# Patient Record
Sex: FEMALE | Race: WHITE
[De-identification: names, ages, dates, MRNs, and addresses within clinical notes are randomized per-mention and may not be internally consistent; named-entity substitution may affect disease eponyms.]

---

## 2021-05-14 ENCOUNTER — HOSPITAL ENCOUNTER (OUTPATIENT)
Dept: HOSPITAL 61 - US | Age: 81
End: 2021-05-14
Attending: EMERGENCY MEDICINE
Payer: MEDICARE

## 2021-05-14 DIAGNOSIS — I87.011: Primary | ICD-10-CM

## 2021-05-14 DIAGNOSIS — L97.212: ICD-10-CM

## 2021-05-14 PROCEDURE — 93926 LOWER EXTREMITY STUDY: CPT

## 2021-05-14 PROCEDURE — 93922 UPR/L XTREMITY ART 2 LEVELS: CPT

## 2021-05-14 NOTE — RAD
DUPLEX SONOGRAPHY OF THE PERIPHERAL ARTERIAL SYSTEM OF THE RIGHT LOWER EXTREMITY



Clinical indications: Nonhealing wound of the right leg.



Findings: Duplex sonography of the peripheral arterial system of the right lower extremity including 
gray scale and color flow and spectral waveform analysis was performed.Triphasic waveforms are seen. 
No occlusive disease is seen. No significant plaque formation or stenosis is identified.



The measurements were performed using the NASCET criteria.



Peak systolic flow velocities are as follows:



Right leg:  common femoral artery- 163 cm/sec, profunda femoral artery -59 cm/sec, proximal superfici
al femoral artery -82 cm/sec, mid superficial femoral artery -85 cm/sec, distal superficial femoral a
rtery- 131 cm/sec, popliteal artery -88 cm/sec, proximal posterior tibial artery- 69 cm/sec, distal p
osterior tibial artery- 38 cm/sec, peroneal artery-not visualized, anterior tibial artery- C7 cm/sec,
 dorsalis pedis artery -104 cm/sec.







IMPRESSION: No significant peripheral arterial vascular disease is seen by duplex sonographic evaluat
ion.





ANKLE BRACHIAL INDEX STUDY ON THE RIGHT SIDE:



Clinical indications: Same.



Right side: 



  Arm: The systolic blood pressure is 198 mmHg

  Ankle: Could not compress. Therefore, the ankle brachial index on the right side could not be obtai
jennifer.



IMPRESSION: Could not obtain DERICK on the right side.



Electronically signed by: Gama Che MD (5/14/2021 4:11 PM) [id number]

## 2021-05-14 NOTE — RAD
DUPLEX SONOGRAPHY OF THE PERIPHERAL ARTERIAL SYSTEM OF THE RIGHT LOWER EXTREMITY



Clinical indications: Nonhealing wound of the right leg.



Findings: Duplex sonography of the peripheral arterial system of the right lower extremity including 
gray scale and color flow and spectral waveform analysis was performed.Triphasic waveforms are seen. 
No occlusive disease is seen. No significant plaque formation or stenosis is identified.



The measurements were performed using the NASCET criteria.



Peak systolic flow velocities are as follows:



Right leg:  common femoral artery- 163 cm/sec, profunda femoral artery -59 cm/sec, proximal superfici
al femoral artery -82 cm/sec, mid superficial femoral artery -85 cm/sec, distal superficial femoral a
rtery- 131 cm/sec, popliteal artery -88 cm/sec, proximal posterior tibial artery- 69 cm/sec, distal p
osterior tibial artery- 38 cm/sec, peroneal artery-not visualized, anterior tibial artery- C7 cm/sec,
 dorsalis pedis artery -104 cm/sec.







IMPRESSION: No significant peripheral arterial vascular disease is seen by duplex sonographic evaluat
ion.





ANKLE BRACHIAL INDEX STUDY ON THE RIGHT SIDE:



Clinical indications: Same.



Right side: 



  Arm: The systolic blood pressure is 198 mmHg

  Ankle: Could not compress. Therefore, the ankle brachial index on the right side could not be obtai
jennifer.



IMPRESSION: Could not obtain DERICK on the right side.



Electronically signed by: Gama Che MD (5/14/2021 4:11 PM) [id number]

## 2021-06-28 ENCOUNTER — HOSPITAL ENCOUNTER (OUTPATIENT)
Dept: HOSPITAL 61 - SURG | Age: 81
Discharge: HOME | End: 2021-06-28
Attending: SURGERY
Payer: COMMERCIAL

## 2021-06-28 VITALS — BODY MASS INDEX: 19.03 KG/M2 | HEIGHT: 67 IN | WEIGHT: 121.25 LBS

## 2021-06-28 VITALS — SYSTOLIC BLOOD PRESSURE: 167 MMHG | DIASTOLIC BLOOD PRESSURE: 90 MMHG

## 2021-06-28 VITALS — SYSTOLIC BLOOD PRESSURE: 199 MMHG | DIASTOLIC BLOOD PRESSURE: 109 MMHG

## 2021-06-28 DIAGNOSIS — Z79.4: ICD-10-CM

## 2021-06-28 DIAGNOSIS — F32.9: ICD-10-CM

## 2021-06-28 DIAGNOSIS — C44.722: Primary | ICD-10-CM

## 2021-06-28 DIAGNOSIS — Z79.899: ICD-10-CM

## 2021-06-28 DIAGNOSIS — I10: ICD-10-CM

## 2021-06-28 DIAGNOSIS — Z98.890: ICD-10-CM

## 2021-06-28 DIAGNOSIS — F41.9: ICD-10-CM

## 2021-06-28 DIAGNOSIS — Z87.891: ICD-10-CM

## 2021-06-28 DIAGNOSIS — Z85.828: ICD-10-CM

## 2021-06-28 PROCEDURE — A4930 STERILE, GLOVES PER PAIR: HCPCS

## 2021-06-28 PROCEDURE — A6402 STERILE GAUZE <= 16 SQ IN: HCPCS

## 2021-06-28 PROCEDURE — 88305 TISSUE EXAM BY PATHOLOGIST: CPT

## 2021-06-28 PROCEDURE — 11603 EXC TR-EXT MAL+MARG 2.1-3 CM: CPT

## 2021-06-28 RX ADMIN — FENTANYL CITRATE PRN MCG: 50 INJECTION INTRAMUSCULAR; INTRAVENOUS at 10:15

## 2021-06-28 RX ADMIN — FENTANYL CITRATE PRN MCG: 50 INJECTION INTRAMUSCULAR; INTRAVENOUS at 10:22

## 2021-06-28 NOTE — PDOC4
OPERATIVE NOTE


Date:


Date:  Jun 28, 2021





Pre-Op Diagnosis:


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





Post-Op Diagnosis:


same





Procedure Performed:


Excision of right lateral squamous cell cancer ulcer, 3 cm in length





Surgeon:


Acosta Duran





Anesthesia Type:


GETA plus local





Blood Loss:


10





Specimans Obtained:


right lateral leg skin ulcer, stitch placed inferiorly (longitudinal biopsy in 

elliptical fashion)





Findings:


skin ulcer, minimal subcutaneous fat





Complications:


none





Operative Note:


After obtaining informed consent, patient was taken to OR, induced under GETA 

and prepped in the usual fashion over the right lower leg.  Site marked with 

patient preop.  Longitudinal elliptical incision was made to include all 

visually abnormal tissue with cautery with 2 mm margin.  Further margin making 

wound difficult to close.  Dissection continued down to fascia.  Specimen sent 

to pathology.  Hemostasis obtained with cautery.  No evidence of bleeding at 

time of closure.  Skin flaps created circumstantially.  Minimal subcutaneous 

tissue noted.  Skin repaired with multiple vertical and interrupted 2 0 nylon 

with good closure.  Dressing placed.





Patient tolerated procedure well and sent to PACU in stable condition.  All 

counts correct.  Wound class is 3 (ulceration).











DARBY DURAN MD             Jun 28, 2021 10:12

## 2021-06-28 NOTE — PDOC
SURGICAL PROGRESS NOTE


DATE: 6/28/21 


TIME: 08:48


Subjective


Pre-Op Note


79 yo F with right lateral leg squamous cell skin cancer.


TO OR for excisional biopsy.


R/R/B/A d/w pt and pt's supportive son.


Risks, including, but not limited to:  bleeding, infection, damage to 

surrounding structures, risk of anesthesia, risk of wound healing being 

prolonged, need for reexcision.


They appear to understand, their questions are answered and they elect proceed.


Office note H&P reviewed and unchanged.





Justicifation of Admission Dx:


Justifications for Admission:


Justification of Admission Dx:  N/A











DARBY DURAN MD             Jun 28, 2021 08:56

## 2021-06-29 NOTE — PATHOLOGY
[facility] Accession Number: [id number]

.                                                                01

Material submitted:                                        .

leg - LOWER LATERAL LEG SKIN BIOPSY, STITCH INFERIOR. Modifiers: lower,

lateral

.                                                                01

Clinical history:                                          .

RIGHT LOWER LEG SQUAMOUS CELL CARCINOMA

RIGHT SKIN EXCISIONAL BIOPSY OF LATERAL ULCER

.                                                                02

**********************************************************************

Diagnosis:

Skin and subcutaneous tissue, right lower lateral leg excisional biopsy:

- INVASIVE POORLY DIFFERENTIATED CARCINOMA SHOWING BASALOID AND SQUAMOID

FEATURES - INKED MARGINS OF EXCISION FREE OF NEOPLASM.

(JPM/db; 6/29/2021)

LBQ  06/29/2021  1552 Local

**********************************************************************

.                                                                02

Electronically signed:                                     .

Davie Hernandez MD, Pathologist

NPI- [id number]

.                                                                01

Gross description:                                         .

Received in formalin labeled "Kevin Anu and lower lateral leg biopsy

stitch-inferior".  Received is a suture oriented mottled tan-brown

elliptical excision of skin measuring 3.5 x 1.6 and excised to 0.3 cm.

The skin surface has a pink-brown depressed lesion measuring 1.6 x 1.0 x

0.1 cm.  The suture designates the inferior tip (re-designated 6:00 tip).

The specimen is inked yellow (12:00 tip to 3:00), blue (3:00 to 6:00 tip),

and black (6:00 tip to 12:00 tip and deep).  The specimen is serially

sectioned from the 12:00 to 6:00 tip to reveal congested pink-tan cut

surfaces.  The specimen is entirely submitted in cassettes A1 thru A4

(tips in A4).(BLJ; 6/28/2021)

BLJ/BLJ  06/28/2021  2223 Local

.                                                                02

Pathologist provided ICD-10:

C44.702

.                                                                02

CPT                                                        .

727122

Specimen Comment: A courtesy copy of this report has been sent to [phone], [phone]

Specimen Comment: [id number]

Specimen Comment: Report sent to  / DR CHADWICK

***Performed at:  01

   [facility]

   [address]  [id number]

   MD Timi Cook MD Phone:  [phone]

***Performed at:  02

   [facility]

   [address] [place], [place]  [id number]

   MD Davie Hernandez MD Phone:  [phone]